# Patient Record
Sex: FEMALE | Race: ASIAN | NOT HISPANIC OR LATINO | Employment: UNEMPLOYED | URBAN - METROPOLITAN AREA
[De-identification: names, ages, dates, MRNs, and addresses within clinical notes are randomized per-mention and may not be internally consistent; named-entity substitution may affect disease eponyms.]

---

## 2021-12-14 ENCOUNTER — OFFICE VISIT (OUTPATIENT)
Dept: FAMILY MEDICINE CLINIC | Facility: CLINIC | Age: 31
End: 2021-12-14

## 2021-12-14 VITALS
BODY MASS INDEX: 33.88 KG/M2 | HEIGHT: 66 IN | WEIGHT: 210.8 LBS | DIASTOLIC BLOOD PRESSURE: 90 MMHG | OXYGEN SATURATION: 99 % | HEART RATE: 79 BPM | TEMPERATURE: 96.8 F | SYSTOLIC BLOOD PRESSURE: 137 MMHG

## 2021-12-14 DIAGNOSIS — N92.6 IRREGULAR PERIODS: ICD-10-CM

## 2021-12-14 DIAGNOSIS — Z86.32 HISTORY OF GESTATIONAL DIABETES: ICD-10-CM

## 2021-12-14 DIAGNOSIS — R63.5 WEIGHT GAIN: ICD-10-CM

## 2021-12-14 DIAGNOSIS — Z00.00 ROUTINE GENERAL MEDICAL EXAMINATION AT A HEALTH CARE FACILITY: Primary | ICD-10-CM

## 2021-12-14 LAB
B-HCG UR QL: NEGATIVE
BILIRUB BLD-MCNC: NEGATIVE MG/DL
CLARITY, POC: CLEAR
COLOR UR: YELLOW
EXPIRATION DATE: ABNORMAL
EXPIRATION DATE: NORMAL
GLUCOSE UR STRIP-MCNC: NEGATIVE MG/DL
INTERNAL NEGATIVE CONTROL: NORMAL
INTERNAL POSITIVE CONTROL: NORMAL
KETONES UR QL: NEGATIVE
LEUKOCYTE EST, POC: NEGATIVE
Lab: ABNORMAL
Lab: NORMAL
NITRITE UR-MCNC: NEGATIVE MG/ML
PH UR: 6 [PH] (ref 5–8)
PROT UR STRIP-MCNC: NEGATIVE MG/DL
RBC # UR STRIP: ABNORMAL /UL
SP GR UR: 1.03 (ref 1–1.03)
UROBILINOGEN UR QL: NORMAL

## 2021-12-14 PROCEDURE — 99385 PREV VISIT NEW AGE 18-39: CPT | Performed by: NURSE PRACTITIONER

## 2021-12-14 PROCEDURE — 81003 URINALYSIS AUTO W/O SCOPE: CPT | Performed by: NURSE PRACTITIONER

## 2021-12-14 PROCEDURE — 81025 URINE PREGNANCY TEST: CPT | Performed by: NURSE PRACTITIONER

## 2021-12-14 NOTE — PROGRESS NOTES
"Chief Complaint  Establish Care (new pt physical )    Subjective          Valeria Ulloa presents to [facility]  History of Present Illness new pt to [facility] care for physical.  Had considered herself overall healthy but recently has been gaining a lot of weight without diet change.  Does not really exercise due to time spent caring for house and 3 yr old child.      Had some hormonal issues in [place] but not been on meds in past.  Periods have been irregular.  Usually appr 6 weeks apart, but not had period now x 2 months.  Condoms for contraception.  Interested in IUD.  No desire for kids soon.     Has had quite a bit of stress, chronic sleep difficulties-trouble falling asleep.  Not taking anything.      Had gestational diabetes resolved after pregnancy and pregnancy induced HTN not medicated.     Dads side family with diabetes, HLD  Both sides have HTN    Denies:  fever, chills,weakness, rash, joint or muscle pain.  No HA, chest pain, palpitations, cough, dyspnea, trouble swallowing, sore throat, ear or nose problems.  No abd pain, n/v/d/constipation, melena, reflux.  No urinary problems, hematuria.  Denies anxiety, depression,      Admits: fatigue, unintentional weight change, insomnia, intermittently light headed.      PHQ-9 Total Score: 6  KIMBERLY 7 Total Score: 1    Admits snoring.  No screen for SHAGUFTA.     Objective   Vital Signs:   /90   Pulse 79   Temp 96.8 °F (36 °C)   Ht 167.6 cm (66\")   Wt 95.6 kg (210 lb 12.8 oz)   SpO2 99%   BMI 34.02 kg/m²     Physical Exam  Vitals and nursing note reviewed.   Constitutional:       General: She is not in acute distress.     Appearance: She is well-developed. She is obese. She is not ill-appearing.   HENT:      Head: Normocephalic and atraumatic.      Right Ear: Tympanic membrane, ear canal and external ear normal.      Left Ear: Tympanic membrane, ear canal and external ear normal.      Mouth/Throat:      Mouth: Mucous membranes are " moist.      Pharynx: Uvula midline. No posterior oropharyngeal erythema.   Eyes:      General:         Right eye: No discharge.         Left eye: No discharge.      Conjunctiva/sclera: Conjunctivae normal.      Pupils: Pupils are equal, round, and reactive to light.   Neck:      Thyroid: No thyromegaly.   Cardiovascular:      Rate and Rhythm: Normal rate and regular rhythm.      Heart sounds: Normal heart sounds. No murmur heard.      Pulmonary:      Effort: Pulmonary effort is normal.      Breath sounds: Normal breath sounds.   Abdominal:      General: Bowel sounds are normal.      Palpations: Abdomen is soft.      Tenderness: There is no abdominal tenderness.   Musculoskeletal:         General: No deformity.      Cervical back: Neck supple.      Comments: Gait smooth and steady   Lymphadenopathy:      Cervical: No cervical adenopathy.   Skin:     General: Skin is warm and dry.   Neurological:      General: No focal deficit present.      Mental Status: She is alert and oriented to person, place, and time.   Psychiatric:         Mood and Affect: Mood normal.         Speech: Speech normal.         Behavior: Behavior normal. Behavior is cooperative.         Thought Content: Thought content normal.        Result Review :                 Assessment and Plan    Diagnoses and all orders for this visit:    1. Routine general medical examination at a health care facility (Primary)  -     Vitamin B12  -     CBC & Differential  -     Comprehensive Metabolic Panel  -     Hemoglobin A1c  -     Lipid Panel With LDL / HDL Ratio  -     TSH Rfx On Abnormal To Free T4  -     Hepatitis C Antibody    2. Irregular periods  -     Ambulatory Referral to Gynecology  -     POCT urinalysis dipstick, automated  -     POCT pregnancy, urine    3. Weight gain    4. History of gestational diabetes      Pt will check BP at home various times of the day and bring back at next visit or send via [employer]    UA showed mild dehydration and mod blood.   She has no sx of UTI-may be starting period-although she is having no vaginal bleeding. Pregnancy test is negative.  Referral to GYN       As part of wellness and prevention, the following topics were discussed with the patient:   Nutrition-diet high in fresh/fozen veges, lower in carbs, unsaturated fats, and higher in lean proteins, adequate hydration-vegetarian-will check B12   Physical activity/regular exercise-150 mins per week of moderate activity that increases HR and is enjoyable to lower stress and improve CVD     Healthy weight-above goal BMI   Contraception-no plans for more children, currently using condoms, interested in IUD   Dental health-recommend twice per year dental cleans and daily flossing to lower inflammation in body   Mental health-stress mgmt and sleep hygiene   Immunization-UTD on covid, recommend flu                Follow Up   Return in about 2 weeks (around 12/28/2021).  Patient was given instructions and counseling regarding her condition or for health maintenance advice. Please see specific information pulled into the AVS if appropriate.

## 2021-12-15 DIAGNOSIS — D64.9 ANEMIA, UNSPECIFIED TYPE: Primary | ICD-10-CM

## 2021-12-15 LAB
ALBUMIN SERPL-MCNC: 4.4 G/DL (ref 3.8–4.8)
ALBUMIN/GLOB SERPL: 1.1 {RATIO} (ref 1.2–2.2)
ALP SERPL-CCNC: 66 IU/L (ref 44–121)
ALT SERPL-CCNC: 26 IU/L (ref 0–32)
AST SERPL-CCNC: 21 IU/L (ref 0–40)
BASOPHILS # BLD AUTO: 0.1 X10E3/UL (ref 0–0.2)
BASOPHILS NFR BLD AUTO: 1 %
BILIRUB SERPL-MCNC: 0.2 MG/DL (ref 0–1.2)
BUN SERPL-MCNC: 9 MG/DL (ref 6–20)
BUN/CREAT SERPL: 10 (ref 9–23)
CALCIUM SERPL-MCNC: 9.4 MG/DL (ref 8.7–10.2)
CHLORIDE SERPL-SCNC: 104 MMOL/L (ref 96–106)
CHOLEST SERPL-MCNC: 147 MG/DL (ref 100–199)
CO2 SERPL-SCNC: 19 MMOL/L (ref 20–29)
CREAT SERPL-MCNC: 0.88 MG/DL (ref 0.57–1)
EOSINOPHIL # BLD AUTO: 0.3 X10E3/UL (ref 0–0.4)
EOSINOPHIL NFR BLD AUTO: 3 %
ERYTHROCYTE [DISTWIDTH] IN BLOOD BY AUTOMATED COUNT: 17.9 % (ref 11.7–15.4)
GLOBULIN SER CALC-MCNC: 3.9 G/DL (ref 1.5–4.5)
GLUCOSE SERPL-MCNC: 87 MG/DL (ref 65–99)
HBA1C MFR BLD: 5.9 % (ref 4.8–5.6)
HCT VFR BLD AUTO: 34.1 % (ref 34–46.6)
HCV AB S/CO SERPL IA: <0.1 S/CO RATIO (ref 0–0.9)
HDLC SERPL-MCNC: 48 MG/DL
HGB BLD-MCNC: 9.9 G/DL (ref 11.1–15.9)
IMM GRANULOCYTES # BLD AUTO: 0 X10E3/UL (ref 0–0.1)
IMM GRANULOCYTES NFR BLD AUTO: 0 %
LDLC SERPL CALC-MCNC: 77 MG/DL (ref 0–99)
LDLC/HDLC SERPL: 1.6 RATIO (ref 0–3.2)
LYMPHOCYTES # BLD AUTO: 2.1 X10E3/UL (ref 0.7–3.1)
LYMPHOCYTES NFR BLD AUTO: 22 %
MCH RBC QN AUTO: 19 PG (ref 26.6–33)
MCHC RBC AUTO-ENTMCNC: 29 G/DL (ref 31.5–35.7)
MCV RBC AUTO: 65 FL (ref 79–97)
MONOCYTES # BLD AUTO: 0.7 X10E3/UL (ref 0.1–0.9)
MONOCYTES NFR BLD AUTO: 8 %
NEUTROPHILS # BLD AUTO: 6.3 X10E3/UL (ref 1.4–7)
NEUTROPHILS NFR BLD AUTO: 66 %
PLATELET # BLD AUTO: 423 X10E3/UL (ref 150–450)
POTASSIUM SERPL-SCNC: 5.1 MMOL/L (ref 3.5–5.2)
PROT SERPL-MCNC: 8.3 G/DL (ref 6–8.5)
RBC # BLD AUTO: 5.22 X10E6/UL (ref 3.77–5.28)
SODIUM SERPL-SCNC: 138 MMOL/L (ref 134–144)
TRIGL SERPL-MCNC: 123 MG/DL (ref 0–149)
TSH SERPL DL<=0.005 MIU/L-ACNC: 2.11 UIU/ML (ref 0.45–4.5)
VIT B12 SERPL-MCNC: 330 PG/ML (ref 232–1245)
VLDLC SERPL CALC-MCNC: 22 MG/DL (ref 5–40)
WBC # BLD AUTO: 9.6 X10E3/UL (ref 3.4–10.8)

## 2021-12-28 ENCOUNTER — OFFICE VISIT (OUTPATIENT)
Dept: FAMILY MEDICINE CLINIC | Facility: CLINIC | Age: 31
End: 2021-12-28

## 2021-12-28 VITALS
TEMPERATURE: 97.5 F | SYSTOLIC BLOOD PRESSURE: 133 MMHG | HEIGHT: 66 IN | RESPIRATION RATE: 12 BRPM | OXYGEN SATURATION: 100 % | BODY MASS INDEX: 33.91 KG/M2 | WEIGHT: 211 LBS | HEART RATE: 84 BPM | DIASTOLIC BLOOD PRESSURE: 82 MMHG

## 2021-12-28 DIAGNOSIS — D64.9 ANEMIA, UNSPECIFIED TYPE: Primary | ICD-10-CM

## 2021-12-28 DIAGNOSIS — E53.8 LOW SERUM VITAMIN B12: ICD-10-CM

## 2021-12-28 DIAGNOSIS — R73.03 PREDIABETES: ICD-10-CM

## 2021-12-28 PROCEDURE — 96372 THER/PROPH/DIAG INJ SC/IM: CPT | Performed by: NURSE PRACTITIONER

## 2021-12-28 PROCEDURE — 99214 OFFICE O/P EST MOD 30 MIN: CPT | Performed by: NURSE PRACTITIONER

## 2021-12-28 RX ORDER — CYANOCOBALAMIN 1000 UG/ML
1000 INJECTION, SOLUTION INTRAMUSCULAR; SUBCUTANEOUS ONCE
Status: COMPLETED | OUTPATIENT
Start: 2021-12-28 | End: 2021-12-28

## 2021-12-28 RX ORDER — MULTIVIT WITH MINERALS/LUTEIN
250 TABLET ORAL DAILY
Qty: 90 TABLET | Refills: 1 | Status: SHIPPED | OUTPATIENT
Start: 2021-12-28 | End: 2022-05-26

## 2021-12-28 RX ADMIN — CYANOCOBALAMIN 1000 MCG: 1000 INJECTION, SOLUTION INTRAMUSCULAR; SUBCUTANEOUS at 15:43

## 2021-12-28 NOTE — PROGRESS NOTES
"Answers for HPI/ROS submitted by the patient on 12/21/2021  Please describe your symptoms.: Tierdness, shortnes of breath, headach  Have you had these symptoms before?: No  How long have you been having these symptoms?: Greater than 2 weeks  What is the primary reason for your visit?: Other    Chief Complaint  No chief complaint on file.     CC:  Fatigue, lab follow up    Subjective          Valeria Ulloa presents to [facility]  History of Present Illness returns today for f/u on fatigue, labs.  She has reviewed labs just done.  Has had labs for thalassemia done, but not yet seen results which are not completely back yet.  Has not been told in the past that she has thalassemia and thinks she had lab work to be checked for it and was negative.  As far as she knows her child does not have thalassemia.  Unknown family history.    She was not surprised she had prediabetes.  She has been eating more carbohydrates than normal and with gestational diabetes was really good about being able to modify carbohydrates and use glucometer to monitor her glucose.  She prefers to use dietary modifications over starting medicine at this time.    Since last visit she did have a pretty heavy period.  Has had heavy periods in past.  Has not yet heard back on GYN referral.     Does not eat meat and not supplementing B12.       Objective   Vital Signs:   /82   Pulse 84   Temp 97.5 °F (36.4 °C) (Infrared)   Resp 12   Ht 167.6 cm (66\")   Wt 95.7 kg (211 lb)   SpO2 100%   BMI 34.06 kg/m²     Physical Exam  Vitals and nursing note reviewed.   Constitutional:       General: She is not in acute distress.     Appearance: She is well-developed. She is not ill-appearing or diaphoretic.   HENT:      Head: Normocephalic and atraumatic.   Eyes:      General:         Right eye: No discharge.         Left eye: No discharge.      Conjunctiva/sclera: Conjunctivae normal.   Cardiovascular:      Rate and " Rhythm: Normal rate and regular rhythm.      Heart sounds: Normal heart sounds.   Pulmonary:      Effort: Pulmonary effort is normal.      Breath sounds: Normal breath sounds.   Abdominal:      General: Bowel sounds are normal.      Palpations: Abdomen is soft.      Tenderness: There is no abdominal tenderness.   Musculoskeletal:         General: No deformity.      Comments: Gait smooth and steady   Skin:     General: Skin is warm and dry.   Neurological:      Mental Status: She is alert and oriented to person, place, and time.   Psychiatric:         Mood and Affect: Mood normal.         Behavior: Behavior normal.        Result Review :                 Assessment and Plan    Diagnoses and all orders for this visit:    1. Anemia, unspecified type (Primary)  -     Iron, Ferrous Sulfate, 325 (65 Fe) MG tablet; Take 1 tablet by mouth Daily. Take with vitamin C  Dispense: 90 tablet; Refill: 1  -     vitamin C (ASCORBIC ACID) 250 MG tablet; Take 1 tablet by mouth Daily.  Dispense: 90 tablet; Refill: 1  -     cyanocobalamin injection 1,000 mcg  -     Iron Profile; Future  -     Ferritin; Future  -     CBC & Differential; Future    2. Prediabetes  -     Hemoglobin A1c; Future    3. Low serum vitamin B12      Anemia:  thalassemia labs pending, but will start iron and vitamin C.  Her ferritin and iron saturation are very low. Will give her number for GYN to get scheduled for eval.  Not sure if heavy periods causing anemia.     Low B12:  Will start b12 injections weekly x 4, then monthly x 4 and then recheck B12 levels appr 1 month after last injection.  She can start oral additionally.     Prediabetes:   Seems to have good grasp of carb mgmt.  Will start managing diet again and will recheck a1c in 3 months with iron, ferritin, cbc.          Follow Up   No follow-ups on file.  Patient was given instructions and counseling regarding her condition or for health maintenance advice. Please see specific information pulled into the  AVS if appropriate.

## 2021-12-29 DIAGNOSIS — R73.03 PREDIABETES: Primary | ICD-10-CM

## 2021-12-29 DIAGNOSIS — Z86.32 HISTORY OF GESTATIONAL DIABETES: ICD-10-CM

## 2021-12-29 PROBLEM — D64.9 ANEMIA: Status: ACTIVE | Noted: 2021-12-29

## 2022-01-04 ENCOUNTER — CLINICAL SUPPORT (OUTPATIENT)
Dept: FAMILY MEDICINE CLINIC | Facility: CLINIC | Age: 32
End: 2022-01-04

## 2022-01-04 DIAGNOSIS — E53.8 LOW SERUM VITAMIN B12: Primary | ICD-10-CM

## 2022-01-04 PROCEDURE — 96372 THER/PROPH/DIAG INJ SC/IM: CPT | Performed by: NURSE PRACTITIONER

## 2022-01-04 RX ORDER — CYANOCOBALAMIN 1000 UG/ML
1000 INJECTION, SOLUTION INTRAMUSCULAR; SUBCUTANEOUS
Status: SHIPPED | OUTPATIENT
Start: 2022-01-04

## 2022-01-04 RX ADMIN — CYANOCOBALAMIN 1000 MCG: 1000 INJECTION, SOLUTION INTRAMUSCULAR; SUBCUTANEOUS at 16:40

## 2022-01-11 ENCOUNTER — CLINICAL SUPPORT (OUTPATIENT)
Dept: FAMILY MEDICINE CLINIC | Facility: CLINIC | Age: 32
End: 2022-01-11

## 2022-01-11 DIAGNOSIS — E53.8 LOW SERUM VITAMIN B12: Primary | ICD-10-CM

## 2022-01-11 PROCEDURE — 82607 VITAMIN B-12: CPT | Performed by: NURSE PRACTITIONER

## 2022-01-18 ENCOUNTER — CLINICAL SUPPORT (OUTPATIENT)
Dept: FAMILY MEDICINE CLINIC | Facility: CLINIC | Age: 32
End: 2022-01-18

## 2022-01-18 PROCEDURE — 96372 THER/PROPH/DIAG INJ SC/IM: CPT | Performed by: NURSE PRACTITIONER

## 2022-01-18 RX ADMIN — CYANOCOBALAMIN 1000 MCG: 1000 INJECTION, SOLUTION INTRAMUSCULAR; SUBCUTANEOUS at 16:14

## 2022-03-14 DIAGNOSIS — D64.9 ANEMIA, UNSPECIFIED TYPE: ICD-10-CM

## 2022-03-14 RX ORDER — MULTIVIT WITH MINERALS/LUTEIN
250 TABLET ORAL DAILY
Qty: 90 TABLET | Refills: 1 | OUTPATIENT
Start: 2022-03-14

## 2022-03-14 NOTE — TELEPHONE ENCOUNTER
Rx Refill Note  Requested Prescriptions     Pending Prescriptions Disp Refills   • Iron, Ferrous Sulfate, 325 (65 Fe) MG tablet 90 tablet 1     Sig: Take 1 tablet by mouth Daily. Take with vitamin C   • vitamin C (ASCORBIC ACID) 250 MG tablet 90 tablet 1     Sig: Take 1 tablet by mouth Daily.      Last office visit with prescribing clinician: 12/28/2021      Next office visit with prescribing clinician: Visit date not found            Meredith Bassett MA  03/14/22, 13:07 EDT

## 2022-05-11 ENCOUNTER — OFFICE VISIT (OUTPATIENT)
Dept: FAMILY MEDICINE CLINIC | Facility: CLINIC | Age: 32
End: 2022-05-11

## 2022-05-11 VITALS
HEIGHT: 66 IN | SYSTOLIC BLOOD PRESSURE: 129 MMHG | DIASTOLIC BLOOD PRESSURE: 80 MMHG | HEART RATE: 81 BPM | BODY MASS INDEX: 34.82 KG/M2 | WEIGHT: 216.7 LBS | OXYGEN SATURATION: 100 % | TEMPERATURE: 98 F

## 2022-05-11 DIAGNOSIS — E53.8 LOW SERUM VITAMIN B12: ICD-10-CM

## 2022-05-11 DIAGNOSIS — D50.0 IRON DEFICIENCY ANEMIA DUE TO CHRONIC BLOOD LOSS: Primary | ICD-10-CM

## 2022-05-11 DIAGNOSIS — R73.03 PREDIABETES: ICD-10-CM

## 2022-05-11 PROCEDURE — 99214 OFFICE O/P EST MOD 30 MIN: CPT | Performed by: NURSE PRACTITIONER

## 2022-05-11 RX ORDER — LANCETS 33 GAUGE
EACH MISCELLANEOUS 2 TIMES DAILY
COMMUNITY
Start: 2022-02-08

## 2022-05-11 RX ORDER — DROSPIRENONE 4 MG/1
1 TABLET, FILM COATED ORAL DAILY
COMMUNITY
Start: 2022-02-02

## 2022-05-11 RX ORDER — BLOOD SUGAR DIAGNOSTIC
STRIP MISCELLANEOUS 2 TIMES DAILY
COMMUNITY
Start: 2022-02-08

## 2022-05-11 NOTE — PROGRESS NOTES
"Answers for HPI/ROS submitted by the patient on 5/10/2022  Please describe your symptoms.: Follow up  Have you had these symptoms before?: No  How long have you been having these symptoms?: Greater than 2 weeks  Please list any medications you are currently taking for this condition.: No medicine  What is the primary reason for your visit?: Other    Chief Complaint  Anemia (F/u anemia )    Juani Ulloa presents to [facility]  History of Present Illness patient is here to follow-up labs for anemia.  She is taking and tolerating POP without side effects.  Periods are not currently excessive.    She completed iron with vitamin C.  Does feel a little bit more energetic.    She has been monitoring her blood sugar for prediabetes and appears they have been at goal 2 hours post prandial for the most part.  She does sometimes eat sweets and this will elevate her blood sugar as expected.    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  Reports spouse does not have similar as far she knows.  She does report she had malaria many years ago.    She is not excessively fatigued, no headache, no dizziness, no chest pain, no palpitations, no dyspnea with exertion.  No melena, hematuria, especially heavy periods.    Objective   Vital Signs:  /80   Pulse 81   Temp 98 °F (36.7 °C)   Ht 167.6 cm (66\")   Wt 98.3 kg (216 lb 11.2 oz)   SpO2 100%   BMI 34.98 kg/m²             Physical Exam  Vitals and nursing note reviewed.   Constitutional:       General: She is not in acute distress.     Appearance: She is well-developed. She is not ill-appearing or diaphoretic.   HENT:      Head: Normocephalic and atraumatic.   Eyes:      General:         Right eye: No discharge.         Left eye: No discharge.      Conjunctiva/sclera: Conjunctivae normal.   Cardiovascular:      Rate and Rhythm: Normal rate and regular " rhythm.      Heart sounds: Normal heart sounds.   Pulmonary:      Effort: Pulmonary effort is normal.      Breath sounds: Normal breath sounds.   Abdominal:      General: Bowel sounds are normal.      Palpations: Abdomen is soft.      Tenderness: There is no abdominal tenderness.   Musculoskeletal:         General: No deformity.      Cervical back: Neck supple.      Comments: Gait smooth and steady   Lymphadenopathy:      Cervical: No cervical adenopathy.   Skin:     General: Skin is warm and dry.   Neurological:      General: No focal deficit present.      Mental Status: She is alert and oriented to person, place, and time.   Psychiatric:         Mood and Affect: Mood normal.         Behavior: Behavior normal.      Comments: Very pleasant, conversant, good medical historian        Result Review :                 Assessment and Plan    Diagnoses and all orders for this visit:    1. Iron deficiency anemia due to chronic blood loss (Primary)    2. Low serum vitamin B12  -     Vitamin B12    3. Prediabetes      Iron deficiency anemia: She has completed supplementation with iron and vitamin C.  Will recheck levels.  If iron and ferritin are still low will continue supplementation.  She may appear anemic because of hemoglobinopathies so we will need to be careful with iron supplementation to avoid overload.    Low vitamin B12: She has been receiving supplement and will recheck.    Prediabetes: She has been monitoring her blood sugar at home with dietary changes.  Monitoring at home has been helpful for her to be able to make carbohydrate adjustments.  We will check A1c today and continue current plan pending A1c.    Reviewed hemoglobinopathy with patient.       Follow Up   No follow-ups on file.  Patient was given instructions and counseling regarding her condition or for health maintenance advice. Please see specific information pulled into the AVS if appropriate.

## 2022-05-26 ENCOUNTER — OFFICE VISIT (OUTPATIENT)
Dept: FAMILY MEDICINE CLINIC | Facility: CLINIC | Age: 32
End: 2022-05-26

## 2022-05-26 VITALS
TEMPERATURE: 97.5 F | HEIGHT: 66 IN | DIASTOLIC BLOOD PRESSURE: 82 MMHG | BODY MASS INDEX: 34.39 KG/M2 | OXYGEN SATURATION: 99 % | SYSTOLIC BLOOD PRESSURE: 120 MMHG | WEIGHT: 214 LBS | HEART RATE: 78 BPM

## 2022-05-26 DIAGNOSIS — E11.65 INADEQUATELY CONTROLLED DIABETES MELLITUS: Primary | ICD-10-CM

## 2022-05-26 PROCEDURE — 99214 OFFICE O/P EST MOD 30 MIN: CPT | Performed by: NURSE PRACTITIONER

## 2022-05-26 NOTE — PROGRESS NOTES
"Answers for HPI/ROS submitted by the patient on 5/26/2022  What is the primary reason for your visit?: Diabetes    Chief Complaint  Diabetes (F/u dm )    Subjective          Valeria Ulloa presents to [facility]  Diabetes  She has type 2 diabetes mellitus. No MedicAlert identification noted. Hypoglycemia symptoms include hunger, mood changes and sweats. Pertinent negatives for hypoglycemia include no confusion, dizziness, pallor, seizures, sleepiness, speech difficulty or tremors. Associated symptoms include polydipsia, polyphagia and polyuria. Pertinent negatives for diabetes include no blurred vision, no chest pain, no fatigue, no foot paresthesias, no foot ulcerations, no visual change, no weakness and no weight loss. Pertinent negatives for hypoglycemia complications include no blackouts, no hospitalization, no nocturnal hypoglycemia, no required assistance and no required glucagon injection. Symptoms are stable. Pertinent negatives for diabetic complications include no CVA, heart disease, impotence, nephropathy, peripheral neuropathy, PVD or retinopathy. There are no known risk factors for coronary artery disease. When asked about current treatments, none were reported. She is compliant with treatment most of the time.    pt here for [id number] dx. Had been hovering in prediabetic range and most recent A1C now in diabetic range.  She has been trying to make dietary modifications and even so has noted glucose elevated 140-150.     Taking multivitamin with Fe for Fe deficient anemia now repleted.  Low B12 has also been repleted.       Objective   Vital Signs:  /82   Pulse 78   Temp 97.5 °F (36.4 °C)   Ht 167.6 cm (66\")   Wt 97.1 kg (214 lb)   SpO2 99%   BMI 34.54 kg/m²         Physical Exam  Vitals and nursing note reviewed.   Constitutional:       General: She is not in acute distress.     Appearance: She is well-developed. She is not ill-appearing or diaphoretic. "   HENT:      Head: Normocephalic and atraumatic.   Eyes:      General:         Right eye: No discharge.         Left eye: No discharge.      Conjunctiva/sclera: Conjunctivae normal.   Cardiovascular:      Rate and Rhythm: Normal rate and regular rhythm.      Heart sounds: Normal heart sounds.   Pulmonary:      Effort: Pulmonary effort is normal.      Breath sounds: Normal breath sounds.   Abdominal:      General: Bowel sounds are normal.      Palpations: Abdomen is soft.      Tenderness: There is no abdominal tenderness.   Musculoskeletal:         General: No deformity.      Comments: Gait smooth and steady   Skin:     General: Skin is warm and dry.   Neurological:      General: No focal deficit present.      Mental Status: She is alert and oriented to person, place, and time.   Psychiatric:         Mood and Affect: Mood normal.         Behavior: Behavior normal.        Result Review :                 Assessment and Plan    Diagnoses and all orders for this visit:    1. Inadequately controlled diabetes mellitus (HCC) (Primary)  -     metFORMIN (Glucophage) 500 MG tablet; Take 1 tablet by mouth Daily With Breakfast.  Dispense: 90 tablet; Refill: 0  -     Basic Metabolic Panel; Future  -     Hemoglobin A1c; Future    Patient has gone from prediabetic to diabetic.  We discussed management options.  I think with her lifestyle changes that low-dose metformin will likely get her at least down to prediabetic stage.  We discussed side effects.  If she does not tolerate she will let me know.  Discussed lowering carbs in diet as well.  Discussed foot care and eye care.  We will get a BMP and an A1c in 3 months to see how this is working.  If her blood sugars still remain high and not really lowering on low-dose of metformin, I have asked her to let me know and we will go to metformin twice per day.    Anemia has resolved and we will have her continue her multivitamin with iron.  She is on POP to see if this helps control  periods.    B12 is now adequate and will have her continue multivitamin which has B12.    Will check CBC and B12 in 6 months for stability.         Follow Up   Return in about 3 months (around 8/26/2022).  Patient was given instructions and counseling regarding her condition or for health maintenance advice. Please see specific information pulled into the AVS if appropriate.

## 2022-07-08 DIAGNOSIS — E11.65 INADEQUATELY CONTROLLED DIABETES MELLITUS: ICD-10-CM

## 2022-07-08 NOTE — TELEPHONE ENCOUNTER
Rx Refill Note  Requested Prescriptions     Pending Prescriptions Disp Refills   • metFORMIN (Glucophage) 500 MG tablet 90 tablet 0     Sig: Take 1 tablet by mouth Daily With Breakfast.      Last office visit with prescribing clinician: 5/26/2022      Next office visit with prescribing clinician: Visit date not found            Veda Ritchie MA  07/08/22, 15:10 EDT

## 2022-09-09 DIAGNOSIS — E11.65 INADEQUATELY CONTROLLED DIABETES MELLITUS: ICD-10-CM

## 2022-09-09 NOTE — TELEPHONE ENCOUNTER
Rx Refill Note  Requested Prescriptions     Pending Prescriptions Disp Refills   • metFORMIN (Glucophage) 500 MG tablet 90 tablet 1     Sig: Take 1 tablet by mouth Daily With Breakfast.      Last office visit with prescribing clinician: 5/26/2022      Next office visit with prescribing clinician: Visit date not found       {TIP  Please add Last Relevant Lab Date if appropriate:5/11/2022    AUSTIN Seymour  09/09/22, 13:03 EDT

## 2024-03-22 ENCOUNTER — OFFICE VISIT (OUTPATIENT)
Dept: FAMILY MEDICINE CLINIC | Facility: CLINIC | Age: 34
End: 2024-03-22
Payer: COMMERCIAL

## 2024-03-22 VITALS
HEIGHT: 66 IN | RESPIRATION RATE: 17 BRPM | HEART RATE: 74 BPM | BODY MASS INDEX: 34.39 KG/M2 | DIASTOLIC BLOOD PRESSURE: 78 MMHG | SYSTOLIC BLOOD PRESSURE: 110 MMHG | OXYGEN SATURATION: 100 % | WEIGHT: 214 LBS | TEMPERATURE: 97.8 F

## 2024-03-22 DIAGNOSIS — E53.8 LOW SERUM VITAMIN B12: ICD-10-CM

## 2024-03-22 DIAGNOSIS — D50.0 IRON DEFICIENCY ANEMIA DUE TO CHRONIC BLOOD LOSS: ICD-10-CM

## 2024-03-22 DIAGNOSIS — H66.001 NON-RECURRENT ACUTE SUPPURATIVE OTITIS MEDIA OF RIGHT EAR WITHOUT SPONTANEOUS RUPTURE OF TYMPANIC MEMBRANE: ICD-10-CM

## 2024-03-22 DIAGNOSIS — E11.65 INADEQUATELY CONTROLLED DIABETES MELLITUS: Primary | ICD-10-CM

## 2024-03-22 PROCEDURE — 99214 OFFICE O/P EST MOD 30 MIN: CPT | Performed by: FAMILY MEDICINE

## 2024-03-22 PROCEDURE — 96372 THER/PROPH/DIAG INJ SC/IM: CPT | Performed by: FAMILY MEDICINE

## 2024-03-22 RX ORDER — CEFTRIAXONE SODIUM 250 MG/1
1000 INJECTION, POWDER, FOR SOLUTION INTRAMUSCULAR; INTRAVENOUS ONCE
Status: COMPLETED | OUTPATIENT
Start: 2024-03-22 | End: 2024-03-22

## 2024-03-22 RX ADMIN — CEFTRIAXONE SODIUM 1000 MG: 250 INJECTION, POWDER, FOR SOLUTION INTRAMUSCULAR; INTRAVENOUS at 16:28

## 2024-03-22 NOTE — PROGRESS NOTES
"Chief Complaint  Chief Complaint   Patient presents with    Diabetes     Pt here for f/u     Ear Fullness     Pt c/o ongoing ear fullness        Subjective    History of Present Illness        Valeria Ulloa presents to [facility] for   Earache   There is pain in the right ear. This is a new problem. The current episode started 1 to 4 weeks ago. The problem occurs daily. The problem has been coming and going. The maximum temperature recorded prior to her arrival was unmeasured. The fever has been present for 5 days or more. The pain is at a severity of 5/10. Associated symptoms include headaches and hearing loss. Pertinent negatives include no coughing or drainage. She has tried acetaminophen for the symptoms. The treatment provided significant relief.   Diabetes  She presents for her follow-up diabetic visit. She has type 2 diabetes mellitus. Her disease course has been stable. Hypoglycemia symptoms include headaches. Pertinent negatives for hypoglycemia include no mood changes, pallor, speech difficulty or sweats. Pertinent negatives for diabetes include no chest pain, no foot paresthesias, no polydipsia, no polyphagia and no weakness. Pertinent negatives for hypoglycemia complications include no blackouts and no required assistance. Symptoms are stable. Risk factors for coronary artery disease include diabetes mellitus. Her weight is stable. She is following a generally healthy diet.        Objective   Vital Signs:   Visit Vitals  /78   Pulse 74   Temp 97.8 °F (36.6 °C)   Resp 17   Ht 167.6 cm (66\")   Wt 97.1 kg (214 lb)   SpO2 100%   BMI 34.54 kg/m²          BMI is >= 30 and <35. (Class 1 Obesity). The following options were offered after discussion;: exercise counseling/recommendations and nutrition counseling/recommendations     Physical Exam  Vitals reviewed.   Constitutional:       Appearance: She is well-developed.   HENT:      Head: Normocephalic.      Right Ear: " External ear normal.      Left Ear: External ear normal.      Nose: Nose normal.   Eyes:      Conjunctiva/sclera: Conjunctivae normal.   Cardiovascular:      Rate and Rhythm: Normal rate and regular rhythm.   Pulmonary:      Effort: Pulmonary effort is normal.      Breath sounds: Normal breath sounds.   Musculoskeletal:         General: Normal range of motion.      Cervical back: Normal range of motion and neck supple.   Skin:     General: Skin is warm and dry.      Capillary Refill: Capillary refill takes less than 2 seconds.   Neurological:      Mental Status: She is alert and oriented to person, place, and time.            Result Review :                    Assessment and Plan      Diagnoses and all orders for this visit:    1. Inadequately controlled diabetes mellitus (Primary)  Assessment & Plan:  Diabetes is stable.   Continue current treatment regimen.  Reminded to bring in blood sugar diary at next visit.  Recommended an ADA diet.  Recommended a Mediterranean style of eating  Regular aerobic exercise.  Discussed ways to avoid symptomatic hypoglycemia.  Discussed sick day management.  Discussed foot care.  Diabetes will be reassessed in 6 months    Orders:  -     CBC & Differential  -     Comprehensive Metabolic Panel  -     TSH  -     Lipid Panel With / Chol / HDL Ratio  -     Hemoglobin A1c    2. Non-recurrent acute suppurative otitis media of right ear without spontaneous rupture of tympanic membrane  Assessment & Plan:  Patient was given a Rocephin 1 g IM injection in clinic.  Patient was encouraged to return if symptoms do not improve.    Orders:  -     cefTRIAXone (ROCEPHIN) injection 1,000 mg    3. Low serum vitamin B12  -     Vitamin B12    4. Iron deficiency anemia due to chronic blood loss  Assessment & Plan:  Labs ordered.    Orders:  -     Iron Profile             Follow Up   No follow-ups on file.  Patient was given instructions and counseling regarding her condition or for health maintenance  advice. Please see specific information pulled into the AVS if appropriate.       Answers submitted by the patient for this visit:  Primary Reason for Visit (Submitted on 3/22/2024)  What is the primary reason for your visit?: Ear Pain  Ear Pain Questionnaire (Submitted on 3/22/2024)  Chief Complaint: Ear pain  dizziness: No

## 2024-04-08 PROBLEM — H66.001 NON-RECURRENT ACUTE SUPPURATIVE OTITIS MEDIA OF RIGHT EAR WITHOUT SPONTANEOUS RUPTURE OF TYMPANIC MEMBRANE: Status: ACTIVE | Noted: 2024-04-08

## 2024-04-08 PROBLEM — E11.65 INADEQUATELY CONTROLLED DIABETES MELLITUS: Status: ACTIVE | Noted: 2024-04-08

## 2024-04-08 NOTE — ASSESSMENT & PLAN NOTE
Diabetes is stable.   Continue current treatment regimen.  Reminded to bring in blood sugar diary at next visit.  Recommended an ADA diet.  Recommended a Mediterranean style of eating  Regular aerobic exercise.  Discussed ways to avoid symptomatic hypoglycemia.  Discussed sick day management.  Discussed foot care.  Diabetes will be reassessed in 6 months

## 2024-04-08 NOTE — ASSESSMENT & PLAN NOTE
Patient was given a Rocephin 1 g IM injection in clinic.  Patient was encouraged to return if symptoms do not improve.

## 2024-04-23 LAB
ALBUMIN SERPL-MCNC: 4.3 G/DL (ref 3.5–5.2)
ALBUMIN/GLOB SERPL: 1.2 G/DL
ALP SERPL-CCNC: 66 U/L (ref 39–117)
ALT SERPL-CCNC: 31 U/L (ref 1–33)
AST SERPL-CCNC: 23 U/L (ref 1–32)
BASOPHILS # BLD AUTO: 0.03 10*3/MM3 (ref 0–0.2)
BASOPHILS NFR BLD AUTO: 0.3 % (ref 0–1.5)
BILIRUB SERPL-MCNC: 0.5 MG/DL (ref 0–1.2)
BUN SERPL-MCNC: 9 MG/DL (ref 6–20)
BUN/CREAT SERPL: 9.5 (ref 7–25)
CALCIUM SERPL-MCNC: 9.4 MG/DL (ref 8.6–10.5)
CHLORIDE SERPL-SCNC: 105 MMOL/L (ref 98–107)
CHOLEST SERPL-MCNC: 141 MG/DL (ref 0–200)
CHOLEST/HDLC SERPL: 2.76 {RATIO}
CO2 SERPL-SCNC: 24.5 MMOL/L (ref 22–29)
CREAT SERPL-MCNC: 0.95 MG/DL (ref 0.57–1)
EGFRCR SERPLBLD CKD-EPI 2021: 80.8 ML/MIN/1.73
EOSINOPHIL # BLD AUTO: 0.15 10*3/MM3 (ref 0–0.4)
EOSINOPHIL NFR BLD AUTO: 1.7 % (ref 0.3–6.2)
ERYTHROCYTE [DISTWIDTH] IN BLOOD BY AUTOMATED COUNT: 14.7 % (ref 12.3–15.4)
GLOBULIN SER CALC-MCNC: 3.5 GM/DL
GLUCOSE SERPL-MCNC: 97 MG/DL (ref 65–99)
HBA1C MFR BLD: 5.8 % (ref 4.8–5.6)
HCT VFR BLD AUTO: 37.7 % (ref 34–46.6)
HDLC SERPL-MCNC: 51 MG/DL (ref 40–60)
HGB BLD-MCNC: 11.8 G/DL (ref 12–15.9)
IMM GRANULOCYTES # BLD AUTO: 0.02 10*3/MM3 (ref 0–0.05)
IMM GRANULOCYTES NFR BLD AUTO: 0.2 % (ref 0–0.5)
IRON SATN MFR SERPL: 15 % (ref 20–50)
IRON SERPL-MCNC: 75 MCG/DL (ref 37–145)
LDLC SERPL CALC-MCNC: 74 MG/DL (ref 0–100)
LYMPHOCYTES # BLD AUTO: 1.78 10*3/MM3 (ref 0.7–3.1)
LYMPHOCYTES NFR BLD AUTO: 20 % (ref 19.6–45.3)
MCH RBC QN AUTO: 23.6 PG (ref 26.6–33)
MCHC RBC AUTO-ENTMCNC: 31.3 G/DL (ref 31.5–35.7)
MCV RBC AUTO: 75.6 FL (ref 79–97)
MONOCYTES # BLD AUTO: 0.64 10*3/MM3 (ref 0.1–0.9)
MONOCYTES NFR BLD AUTO: 7.2 % (ref 5–12)
NEUTROPHILS # BLD AUTO: 6.3 10*3/MM3 (ref 1.7–7)
NEUTROPHILS NFR BLD AUTO: 70.6 % (ref 42.7–76)
NRBC BLD AUTO-RTO: 0 /100 WBC (ref 0–0.2)
PLATELET # BLD AUTO: 348 10*3/MM3 (ref 140–450)
POTASSIUM SERPL-SCNC: 5.2 MMOL/L (ref 3.5–5.2)
PROT SERPL-MCNC: 7.8 G/DL (ref 6–8.5)
RBC # BLD AUTO: 4.99 10*6/MM3 (ref 3.77–5.28)
SODIUM SERPL-SCNC: 138 MMOL/L (ref 136–145)
TIBC SERPL-MCNC: 507 MCG/DL
TRIGL SERPL-MCNC: 82 MG/DL (ref 0–150)
TSH SERPL DL<=0.005 MIU/L-ACNC: 1.85 UIU/ML (ref 0.27–4.2)
UIBC SERPL-MCNC: 432 MCG/DL (ref 112–346)
VIT B12 SERPL-MCNC: 452 PG/ML (ref 211–946)
VLDLC SERPL CALC-MCNC: 16 MG/DL (ref 5–40)
WBC # BLD AUTO: 8.92 10*3/MM3 (ref 3.4–10.8)

## 2024-08-07 ENCOUNTER — OFFICE VISIT (OUTPATIENT)
Dept: FAMILY MEDICINE CLINIC | Facility: CLINIC | Age: 34
End: 2024-08-07
Payer: COMMERCIAL

## 2024-08-07 VITALS
HEART RATE: 84 BPM | OXYGEN SATURATION: 98 % | BODY MASS INDEX: 35.37 KG/M2 | WEIGHT: 220.1 LBS | SYSTOLIC BLOOD PRESSURE: 136 MMHG | HEIGHT: 66 IN | DIASTOLIC BLOOD PRESSURE: 80 MMHG

## 2024-08-07 DIAGNOSIS — R42 DIZZINESS: ICD-10-CM

## 2024-08-07 DIAGNOSIS — E28.2 PCOS (POLYCYSTIC OVARIAN SYNDROME): Primary | ICD-10-CM

## 2024-08-07 PROCEDURE — 99214 OFFICE O/P EST MOD 30 MIN: CPT | Performed by: FAMILY MEDICINE

## 2024-08-07 RX ORDER — NORETHINDRONE ACETATE AND ETHINYL ESTRADIOL .02; 1 MG/1; MG/1
1 TABLET ORAL DAILY
Qty: 90 TABLET | Refills: 3 | Status: SHIPPED | OUTPATIENT
Start: 2024-08-07

## 2024-08-07 NOTE — PROGRESS NOTES
"Chief Complaint  Chief Complaint   Patient presents with    Dizziness     Has been dizzy off and on for the past 2 months.        Subjective    History of Present Illness        Valeria Ulloa presents to [facility] for   History of Present Illness  The patient is a 33-year-old female who presents for evaluation of dizziness.    She reports experiencing increased dizziness, despite sleeping for 6 to 8 hours. Her iron saturation levels were found to be low in her last blood work. She also mentions shortness of breath, which improved after starting a multivitamin and prenatal care regimen. She experienced a similar episode of dizziness 3 years ago, which was diagnosed as B12 deficiency due to low iron saturation. Oral iron supplements cause her to feel uneasy, leading her to discontinue them. A friend, an oncologist, recommended liquid iron, which she took twice daily for 3 months. This treatment was effective, but she is currently out of this medication. Her dizziness has increased in frequency over the past 2 months.    She missed a menstrual cycle a few months ago, which then became regular after 34 days. She is seeking a referral to a gynecologist due to irregular periods, heavy bleeding, and cramping. She is planning for a second child in 6 months. She has tried birth control pills in the past, but discontinued after 2 months due to weight gain.     History of Present Illness      Objective   Vital Signs:   Visit Vitals  /80 (BP Location: Right arm, Patient Position: Sitting, Cuff Size: Large Adult)   Pulse 84   Ht 167.6 cm (66\")   Wt 99.8 kg (220 lb 1.6 oz)   SpO2 98%   BMI 35.53 kg/m²                Physical Exam  Vitals reviewed.   Constitutional:       Appearance: She is well-developed.   HENT:      Head: Normocephalic.      Right Ear: External ear normal.      Left Ear: External ear normal.      Nose: Nose normal.   Eyes:      Conjunctiva/sclera: Conjunctivae normal. "   Cardiovascular:      Rate and Rhythm: Normal rate and regular rhythm.   Pulmonary:      Effort: Pulmonary effort is normal.      Breath sounds: Normal breath sounds.   Musculoskeletal:         General: Normal range of motion.      Cervical back: Normal range of motion and neck supple.   Skin:     General: Skin is warm and dry.      Capillary Refill: Capillary refill takes less than 2 seconds.   Neurological:      Mental Status: She is alert and oriented to person, place, and time.        Physical Exam           Result Review :  Results  Laboratory Studies  Iron saturation is low. Hemoglobin is low. Blood sugar is 5.8.                          Assessment and Plan      Diagnoses and all orders for this visit:    1. PCOS (polycystic ovarian syndrome) (Primary)  Assessment & Plan:  Her Polycystic Ovary Syndrome (PCOS) is suspected due to her irregular, heavy, and painful menstrual cycles. A prescription for birth control pills was provided to regulate her menstrual cycle. She was encouraged to continue working on weight loss. A referral to a gynecologist was provided.    Orders:  -     norethindrone-ethinyl estradiol (Loestrin 1/20, 21,) 1-20 MG-MCG per tablet; Take 1 tablet by mouth Daily.  Dispense: 90 tablet; Refill: 3    2. Dizziness  Assessment & Plan:  Her dizziness may be due to being anemic.  Patient was advised to start taking iron but she does not tolerate the oral pills.  She reports that she can tolerate the liquid iron medication.  Will recheck labs at a later date.         Assessment & Plan             Follow Up   No follow-ups on file.  Patient was given instructions and counseling regarding her condition or for health maintenance advice. Please see specific information pulled into the AVS if appropriate.     Patient or patient representative verbalized consent for the use of Ambient Listening during the visit with  Manny De Jesus Sr, MD for chart documentation. 8/20/2024  15:50 EDT  Answers submitted by  the patient for this visit:  Other (Submitted on 7/31/2024)  Please describe your symptoms.: About my ear infection, Iron deficiency. Also have some gynecological issues  Have you had these symptoms before?: Yes  How long have you been having these symptoms?: Greater than 2 weeks  Primary Reason for Visit (Submitted on 7/31/2024)  What is the primary reason for your visit?: Other

## 2024-08-20 PROBLEM — E28.2 PCOS (POLYCYSTIC OVARIAN SYNDROME): Status: ACTIVE | Noted: 2024-08-20

## 2024-08-20 PROBLEM — R42 DIZZINESS: Status: ACTIVE | Noted: 2024-08-20

## 2024-08-20 NOTE — ASSESSMENT & PLAN NOTE
Her dizziness may be due to being anemic.  Patient was advised to start taking iron but she does not tolerate the oral pills.  She reports that she can tolerate the liquid iron medication.  Will recheck labs at a later date.

## 2024-08-20 NOTE — ASSESSMENT & PLAN NOTE
Her Polycystic Ovary Syndrome (PCOS) is suspected due to her irregular, heavy, and painful menstrual cycles. A prescription for birth control pills was provided to regulate her menstrual cycle. She was encouraged to continue working on weight loss. A referral to a gynecologist was provided.

## 2024-10-23 ENCOUNTER — OFFICE VISIT (OUTPATIENT)
Dept: OBSTETRICS AND GYNECOLOGY | Facility: CLINIC | Age: 34
End: 2024-10-23
Payer: COMMERCIAL

## 2024-10-23 VITALS
DIASTOLIC BLOOD PRESSURE: 70 MMHG | WEIGHT: 222 LBS | BODY MASS INDEX: 35.68 KG/M2 | SYSTOLIC BLOOD PRESSURE: 128 MMHG | HEIGHT: 66 IN

## 2024-10-23 DIAGNOSIS — E28.2 PCOS (POLYCYSTIC OVARIAN SYNDROME): ICD-10-CM

## 2024-10-23 DIAGNOSIS — N92.6 IRREGULAR PERIODS: ICD-10-CM

## 2024-10-23 DIAGNOSIS — E11.65 INADEQUATELY CONTROLLED DIABETES MELLITUS: ICD-10-CM

## 2024-10-23 DIAGNOSIS — Z13.89 SCREENING FOR GENITOURINARY CONDITION: Primary | ICD-10-CM

## 2024-10-23 DIAGNOSIS — D50.0 IRON DEFICIENCY ANEMIA DUE TO CHRONIC BLOOD LOSS: ICD-10-CM

## 2024-10-23 PROBLEM — Z86.32 HISTORY OF GESTATIONAL DIABETES: Status: ACTIVE | Noted: 2024-10-23

## 2024-10-23 PROBLEM — Z87.59 HISTORY OF PRE-ECLAMPSIA: Status: ACTIVE | Noted: 2024-10-23

## 2024-10-23 LAB
B-HCG UR QL: NEGATIVE
BILIRUB BLD-MCNC: NEGATIVE MG/DL
CLARITY, POC: CLEAR
COLOR UR: YELLOW
EXPIRATION DATE: NORMAL
GLUCOSE UR STRIP-MCNC: NEGATIVE MG/DL
INTERNAL NEGATIVE CONTROL: NORMAL
INTERNAL POSITIVE CONTROL: NORMAL
KETONES UR QL: NEGATIVE
LEUKOCYTE EST, POC: NEGATIVE
Lab: NORMAL
NITRITE UR-MCNC: NEGATIVE MG/ML
PH UR: 5 [PH] (ref 5–8)
PROT UR STRIP-MCNC: NEGATIVE MG/DL
RBC # UR STRIP: NEGATIVE /UL
SP GR UR: 1 (ref 1–1.03)
UROBILINOGEN UR QL: NORMAL

## 2024-10-23 NOTE — PROGRESS NOTES
GYN Annual Exam     CC- Here for annual exam   No chief complaint on file.         Valeria Ulloa is a 34 y.o. No obstetric history on file. female who presents for annual well woman exam. No LMP recorded.    Problems in addition to need for annual: ***    HPI: History of Present Illness    PMHX:  Patient Active Problem List   Diagnosis    History of gestational diabetes    Irregular periods    Weight gain    Anemia    Prediabetes    Inadequately controlled diabetes mellitus    Non-recurrent acute suppurative otitis media of right ear without spontaneous rupture of tympanic membrane    PCOS (polycystic ovarian syndrome)    Dizziness   ; otherwise none    OB History    No obstetric history on file.         Past Medical History:   Diagnosis Date    Diabetes mellitus 12/16/2021    Prediabetic       No past surgical history on file.      Current Outpatient Medications:     Lancets (OneTouch Delica Plus [id number]) misc, 2 (Two) Times a Day. test blood sugar (Patient not taking: Reported on 3/22/2024), Disp: , Rfl:     metFORMIN (Glucophage) 500 MG tablet, Take 1 tablet by mouth Daily With Breakfast. (Patient not taking: Reported on 3/22/2024), Disp: 90 tablet, Rfl: 1    norethindrone-ethinyl estradiol (Loestrin 1/20, 21,) 1-20 MG-MCG per tablet, Take 1 tablet by mouth Daily., Disp: 90 tablet, Rfl: 3    OneTouch Verio test strip, 2 (Two) Times a Day. test blood sugar (Patient not taking: Reported on 3/22/2024), Disp: , Rfl:     Slynd 4 MG tablet, Take 1 tablet by mouth Daily. (Patient not taking: Reported on 3/22/2024), Disp: , Rfl:     Current Facility-Administered Medications:     cyanocobalamin injection 1,000 mcg, 1,000 mcg, Intramuscular, Q28 Days, Andreia Davidson APRN, 1,000 mcg at 01/18/22 1614    No Known Allergies    Social History     Tobacco Use    Smoking status: Never    Smokeless tobacco: Never   Vaping Use    Vaping status: Never Used   Substance Use Topics    Alcohol use: Not Currently    Drug use:  Never       Valeria Ulloa  reports that she has never smoked. She has never used smokeless tobacco..   {Tobacco Education/Cessation (Optional):34334}    Family History   Problem Relation Age of Onset    Hypertension Mother     Hypertension Father     Diabetes Father     Hyperlipidemia Father        Review of Systems   Constitutional: Negative.    HENT: Negative.     Eyes: Negative.    Respiratory: Negative.     Cardiovascular: Negative.    Gastrointestinal: Negative.    Endocrine: Negative.    Genitourinary:  Positive for menstrual problem.   Musculoskeletal: Negative.    Skin: Negative.    Allergic/Immunologic: Negative.    Neurological: Negative.    Hematological: Negative.    Psychiatric/Behavioral: Negative.         ***Patient reports that she is not currently experiencing any symptoms of urinary incontinence.      ***TESTED FOR CHLAMYDIA?    Gardisil indicated? (9-44yo) 0,2,6 months: ***    EXAM:  There were no vitals taken for this visit.    Physical Exam  Vitals and nursing note reviewed. Exam conducted with a chaperone present.   Constitutional:       General: She is not in acute distress.     Appearance: She is well-developed. She is not diaphoretic.   HENT:      Head: Normocephalic and atraumatic.      Nose: Nose normal.   Eyes:      Extraocular Movements: Extraocular movements intact.   Cardiovascular:      Rate and Rhythm: Normal rate.   Pulmonary:      Effort: Pulmonary effort is normal.   Chest:   Breasts:     Breasts are symmetrical.      Right: Normal. No mass, nipple discharge, skin change or tenderness.      Left: Normal. No mass, nipple discharge, skin change or tenderness.   Abdominal:      General: There is no distension.      Palpations: Abdomen is soft. There is no mass.      Tenderness: There is no abdominal tenderness. There is no guarding.   Genitourinary:     General: Normal vulva.      Pubic Area: No rash.       Vagina: Normal. No vaginal discharge.      Cervix: Normal.      Uterus:  Normal.       Adnexa: Right adnexa normal and left adnexa normal.   Musculoskeletal:         General: No tenderness or deformity. Normal range of motion.      Cervical back: Normal range of motion.   Lymphadenopathy:      Upper Body:      Right upper body: No axillary adenopathy.      Left upper body: No axillary adenopathy.   Skin:     General: Skin is warm and dry.      Coloration: Skin is not pale.      Findings: No erythema or rash.   Neurological:      Mental Status: She is alert and oriented to person, place, and time.   Psychiatric:         Behavior: Behavior normal.         Thought Content: Thought content normal.         Judgment: Judgment normal.         Labs:   Lab Results (last 24 hours)       ** No results found for the last 24 hours. **            {OBGYN Imaging (Optional):00607}    As part of wellness and prevention, the following topics were discussed with the patient where appropriate: healthy weight, substance abuse/misuse, mental health, encouraging self breast exam, and other counseling and guidance done:  Nutrition, physical activity, healthy weight, injury prevention, misuse of tobacco, alcohol and drugs, sexual behavior and STDs, contraception, dental health, mental health, immunizations breast cancer screening and exams.    Assessment/Plan:     1) GYN annual well woman exam.   2) PAP done today? {yes and no:09384}  3) problems addressed: ***    Mammogram:   Last Completed Mammogram       This patient has no relevant Health Maintenance data.          MAMMOGRAM UP TO DATE IF AGE APPROPRIATE?  {yes and no:86170}  (if no, pt encouraged to schedule; risks of undiagnosed breast cancer reviewed with pt if she does not have a mammogram)    Colonoscopy:   Last Completed Colonoscopy       This patient has no relevant Health Maintenance data.          COLONOSCOPY UP TO DATE IF AGE APPROPRIATE? {yes and no:56551}  (if no, risks of undiagnosed colon cancer reviewed with pt if she fails to have the  colonoscopy)    Fhx breast cancer? {yes and no:23788}    Fhx ovarian cancer? {yes and no:98032}    Fhx colon cancer? {yes and no:80479}    Hereditary Cancer testing offered? {yes and no:25120}         Follow up prn or one year.    Pt instructed to call for results of any testing done today and that failure to call if she has not heard from us could result in inadequate treatment.  Pt verbalized her understanding.   Diagnoses and all orders for this visit:    1. Irregular periods (Primary)    2. Iron deficiency anemia due to chronic blood loss    3. PCOS (polycystic ovarian syndrome)    4. Inadequately controlled diabetes mellitus           RTO No follow-ups on file.      Yaya Krishna MD  10/23/24  12:14 EDT

## 2024-10-23 NOTE — PROGRESS NOTES
"EVALUATION AND MANAGEMENT ENCOUNTER    S:  Chief Complaint   Patient presents with    Menstrual Problem       HPI:  Valeria Ulloa is a 34 y.o.  with Patient's last menstrual period was 10/07/2024. here for evaluation for secondary infertility.  Pt and her  have one child, and they have been trying to get pregnant for almost a year now without success.  Pt was told she had PCOS in the past and was treated with metformin and OCs without proper evaluation.  Pt states her periods are irregular, sometimes early, sometimes late.      Pt denies hirsutism but has had some acne in the past.    Pt's main concern is getting pregnant.     Review of Systems   Constitutional: Negative.    HENT: Negative.     Eyes: Negative.    Respiratory: Negative.     Cardiovascular: Negative.    Gastrointestinal: Negative.    Endocrine: Negative.    Genitourinary:  Positive for menstrual problem.   Musculoskeletal: Negative.    Skin: Negative.    Allergic/Immunologic: Negative.    Neurological: Negative.    Hematological: Negative.    Psychiatric/Behavioral: Negative.     :    Patient reports that she is not currently experiencing any symptoms of urinary incontinence.      noTESTED FOR CHLAMYDIA?    .CESSATIONOPT    Vital Signs: /70   Ht 167.6 cm (66\")   Wt 101 kg (222 lb)   LMP 10/07/2024   Breastfeeding No   BMI 35.83 kg/m²    Flowsheet Rows      Flowsheet Row First Filed Value   Admission Height 167.6 cm (66\") Documented at 10/23/2024 1349   Admission Weight 101 kg (222 lb) Documented at 10/23/2024 1349            Brief Urine Lab Results  (Last result in the past 365 days)        Color   Clarity   Blood   Leuk Est   Nitrite   Protein   CREAT   Urine HCG        10/23/24 1355               Negative       10/23/24 1355 Yellow   Clear   Negative   Negative   Negative   Negative                   Physical Exam  Vitals and nursing note reviewed.   Constitutional:       Appearance: She is well-developed.   HENT:      " Head: Normocephalic and atraumatic.   Cardiovascular:      Rate and Rhythm: Normal rate.   Pulmonary:      Effort: Pulmonary effort is normal.   Abdominal:      General: There is no distension.      Palpations: Abdomen is soft. There is no mass.      Tenderness: There is no abdominal tenderness. There is no guarding.   Genitourinary:     Vagina: No vaginal discharge.   Musculoskeletal:         General: No tenderness or deformity. Normal range of motion.      Cervical back: Normal range of motion.   Skin:     General: Skin is warm and dry.      Coloration: Skin is not pale.      Findings: No erythema or rash.   Neurological:      Mental Status: She is alert and oriented to person, place, and time.   Psychiatric:         Behavior: Behavior normal.         Thought Content: Thought content normal.         Judgment: Judgment normal.             IMPRESSION:      Diagnoses and all orders for this visit:    1. Screening for genitourinary condition (Primary)  -     POC Urinalysis Dipstick  -     POC Pregnancy, Urine    2. Irregular periods    3. Iron deficiency anemia due to chronic blood loss    4. PCOS (polycystic ovarian syndrome)    5. Inadequately controlled diabetes mellitus          * No active hospital problems. *          PLAN:      Sonohystogram, embx, pap, and pt needs fasting PCOS labs at some point.     Pt instructed to call for results of any testing done today if she does not hear from us, and that failure to do so could result in inadequate treatment . Pt verbalized her understanding.     Return in about 2 weeks (around 11/6/2024) for Recheck..  Instructions and precautions given.     Time Spent: I spent 30+ minutes caring for Valeria on this date of service. This time includes time spent by me in the following activities: preparing for the visit, reviewing tests, obtaining and/or reviewing a separately obtained history, performing a medically appropriate examination and/or evaluation, counseling and  educating the patient/family/caregiver, ordering medications, tests, or procedures, referring and communicating with other health care professionals, documenting information in the medical record, independently interpreting results and communicating that information with the patient/family/caregiver, and care coordination.      Yaya Krishna MD  14:36 EDT   10/23/24

## 2024-11-20 PROBLEM — Z31.9 INFERTILITY MANAGEMENT: Status: ACTIVE | Noted: 2024-11-20

## 2024-11-21 NOTE — PROGRESS NOTES
PROCEDURE NOTE FOR SONOHYSTOGRAM OR SALINE INFUSION SONOGRAM (SIS);   PROCEDURE AND INTERPRETATION.        INDICATION/CC/PREOP DX: infertility, irregular periods    POSTOP DX: same    Informed consent obtained.    Pt present in ultrasound room, scount ultrasound was already done by technician.    ANESTHESIA USED: none    In dorsolithotomy position, open graves speculum placed in the vagina.  Pap smear done? no    The  cervix was painted with betadine.    Single tooth tenaculum placed on anterior cervical lip and uterus sounded to 6cms.    Endometrial biopsy suction pipette used to obtain endometrial biopsy and this was sent for permanent section.      Flexible sonohyst catheter placed in endomtrial canal and balloon inflated and 20ccs normal saline infused into endometrial cavity.  Outline of cavity seen clearly and documented.    All instruments were removed.    Pt tolerated procedure well and left in satisfactory condition.    Instructions and precautions given.      IMPRESSION/INTERPRETATION:  normal sonohystogram    PLAN:  call for results.    Yaya Krishna MD  14:27 EST  @today@

## 2024-11-25 ENCOUNTER — PROCEDURE VISIT (OUTPATIENT)
Dept: OBSTETRICS AND GYNECOLOGY | Facility: CLINIC | Age: 34
End: 2024-11-25
Payer: COMMERCIAL

## 2024-11-25 VITALS — BODY MASS INDEX: 35.83 KG/M2 | HEIGHT: 66 IN

## 2024-11-25 DIAGNOSIS — Z11.51 SCREENING FOR HUMAN PAPILLOMAVIRUS: ICD-10-CM

## 2024-11-25 DIAGNOSIS — Z31.9 INFERTILITY MANAGEMENT: ICD-10-CM

## 2024-11-25 DIAGNOSIS — N92.6 IRREGULAR PERIODS: ICD-10-CM

## 2024-11-25 DIAGNOSIS — Z01.419 CERVICAL SMEAR, AS PART OF ROUTINE GYNECOLOGICAL EXAMINATION: Primary | ICD-10-CM

## 2024-11-25 PROBLEM — Z86.32 HISTORY OF GESTATIONAL DIABETES: Status: RESOLVED | Noted: 2024-10-23 | Resolved: 2024-11-25

## 2024-11-25 LAB
B-HCG UR QL: NEGATIVE
BILIRUB BLD-MCNC: NEGATIVE MG/DL
CLARITY, POC: CLEAR
COLOR UR: YELLOW
EXPIRATION DATE: NORMAL
GLUCOSE UR STRIP-MCNC: NEGATIVE MG/DL
INTERNAL NEGATIVE CONTROL: NORMAL
INTERNAL POSITIVE CONTROL: NORMAL
KETONES UR QL: NEGATIVE
LEUKOCYTE EST, POC: NEGATIVE
Lab: NORMAL
NITRITE UR-MCNC: NEGATIVE MG/ML
PH UR: 5 [PH] (ref 5–8)
PROT UR STRIP-MCNC: NEGATIVE MG/DL
RBC # UR STRIP: ABNORMAL /UL
SP GR UR: 1 (ref 1–1.03)
UROBILINOGEN UR QL: NORMAL

## 2024-11-29 LAB
DX ICD CODE: NORMAL
DX ICD CODE: NORMAL
PATH REPORT.FINAL DX SPEC: NORMAL
PATH REPORT.GROSS SPEC: NORMAL
PATH REPORT.SITE OF ORIGIN SPEC: NORMAL
PATHOLOGIST NAME: NORMAL
PAYMENT PROCEDURE: NORMAL

## 2024-12-04 LAB
CYTOLOGIST CVX/VAG CYTO: NORMAL
CYTOLOGY CVX/VAG DOC CYTO: NORMAL
CYTOLOGY CVX/VAG DOC THIN PREP: NORMAL
DX ICD CODE: NORMAL
HPV I/H RISK 4 DNA CVX QL PROBE+SIG AMP: NEGATIVE
Lab: NORMAL
OTHER STN SPEC: NORMAL
STAT OF ADQ CVX/VAG CYTO-IMP: NORMAL

## 2024-12-14 NOTE — PROGRESS NOTES
"EVALUATION AND MANAGEMENT ENCOUNTER    S:  Chief Complaint   Patient presents with    Follow-up     Bx results       HPI:  Valeria Ulloa is a 34 y.o.  with Patient's last menstrual period was 2024 (exact date). here to make a plan for infertility.   needs semen analysis.    Review of Systems   Constitutional: Negative.    HENT: Negative.     Eyes: Negative.    Respiratory: Negative.     Cardiovascular: Negative.    Gastrointestinal: Negative.    Endocrine: Negative.    Musculoskeletal: Negative.    Skin: Negative.    Allergic/Immunologic: Negative.    Neurological: Negative.    Hematological: Negative.    Psychiatric/Behavioral: Negative.     :    Patient reports that she is not currently experiencing any symptoms of urinary incontinence.      noTESTED FOR CHLAMYDIA?    .CESSATIONOPT    Vital Signs: /88   Ht 167.6 cm (66\")   Wt 100 kg (221 lb 6.4 oz)   LMP 2024 (Exact Date)   BMI 35.73 kg/m²      Brief Urine Lab Results  (Last result in the past 365 days)        Color   Clarity   Blood   Leuk Est   Nitrite   Protein   CREAT   Urine HCG        24 1428               Negative       24 1428 Yellow   Clear   Moderate   Negative   Negative   Negative                   Physical Exam  Vitals and nursing note reviewed.   Constitutional:       Appearance: She is well-developed.   HENT:      Head: Normocephalic and atraumatic.   Cardiovascular:      Rate and Rhythm: Normal rate.   Pulmonary:      Effort: Pulmonary effort is normal.   Abdominal:      General: There is no distension.      Palpations: Abdomen is soft. There is no mass.      Tenderness: There is no abdominal tenderness. There is no guarding.   Genitourinary:     Vagina: No vaginal discharge.   Musculoskeletal:         General: No tenderness or deformity. Normal range of motion.      Cervical back: Normal range of motion.   Skin:     General: Skin is warm and dry.      Coloration: Skin is not pale.      Findings: " No erythema or rash.   Neurological:      Mental Status: She is alert and oriented to person, place, and time.   Psychiatric:         Behavior: Behavior normal.         Thought Content: Thought content normal.         Judgment: Judgment normal.             IMPRESSION:      Diagnoses and all orders for this visit:    1. PCOS (polycystic ovarian syndrome) (Primary)    2. Inadequately controlled diabetes mellitus    3. Infertility management          * No active hospital problems. *          PLAN:      Semen analysis, ovulation predictor kits, consider clomid at some point.  Pt has had a child before.     Pt instructed to call for results of any testing done today if she does not hear from us, and that failure to do so could result in inadequate treatment . Pt verbalized her understanding.     No follow-ups on file..  Instructions and precautions given.     Time Spent: I spent 20+ minutes caring for Valeria on this date of service. This time includes time spent by me in the following activities: preparing for the visit, reviewing tests, obtaining and/or reviewing a separately obtained history, performing a medically appropriate examination and/or evaluation, counseling and educating the patient/family/caregiver, ordering medications, tests, or procedures, referring and communicating with other health care professionals, documenting information in the medical record, independently interpreting results and communicating that information with the patient/family/caregiver, and care coordination.      Yaya Krishna MD  15:34 EST   12/17/24

## 2024-12-16 ENCOUNTER — OFFICE VISIT (OUTPATIENT)
Dept: OBSTETRICS AND GYNECOLOGY | Facility: CLINIC | Age: 34
End: 2024-12-16
Payer: COMMERCIAL

## 2024-12-16 VITALS
BODY MASS INDEX: 35.58 KG/M2 | DIASTOLIC BLOOD PRESSURE: 88 MMHG | SYSTOLIC BLOOD PRESSURE: 138 MMHG | HEIGHT: 66 IN | WEIGHT: 221.4 LBS

## 2024-12-16 DIAGNOSIS — Z31.9 INFERTILITY MANAGEMENT: ICD-10-CM

## 2024-12-16 DIAGNOSIS — E11.65 INADEQUATELY CONTROLLED DIABETES MELLITUS: ICD-10-CM

## 2024-12-16 DIAGNOSIS — E28.2 PCOS (POLYCYSTIC OVARIAN SYNDROME): Primary | ICD-10-CM

## 2025-03-20 ENCOUNTER — OFFICE VISIT (OUTPATIENT)
Dept: FAMILY MEDICINE CLINIC | Facility: CLINIC | Age: 35
End: 2025-03-20
Payer: COMMERCIAL

## 2025-03-20 VITALS
OXYGEN SATURATION: 99 % | SYSTOLIC BLOOD PRESSURE: 128 MMHG | HEART RATE: 88 BPM | DIASTOLIC BLOOD PRESSURE: 76 MMHG | HEIGHT: 66 IN | WEIGHT: 223 LBS | BODY MASS INDEX: 35.84 KG/M2

## 2025-03-20 DIAGNOSIS — R51.9 SEVERE HEADACHE: Primary | ICD-10-CM

## 2025-03-20 DIAGNOSIS — H92.01 RIGHT EAR PAIN: ICD-10-CM

## 2025-03-20 PROCEDURE — 99214 OFFICE O/P EST MOD 30 MIN: CPT | Performed by: FAMILY MEDICINE

## 2025-03-20 RX ORDER — PREDNISONE 20 MG/1
40 TABLET ORAL DAILY
Qty: 10 TABLET | Refills: 0 | Status: SHIPPED | OUTPATIENT
Start: 2025-03-20 | End: 2025-03-25

## 2025-03-20 RX ORDER — ACETAMINOPHEN AND CODEINE PHOSPHATE 300; 30 MG/1; MG/1
1 TABLET ORAL EVERY 6 HOURS PRN
Qty: 30 TABLET | Refills: 0 | Status: SHIPPED | OUTPATIENT
Start: 2025-03-20

## 2025-03-20 NOTE — PROGRESS NOTES
Chief Complaint  Chief Complaint   Patient presents with    Headache     Severe       Subjective    History of Present Illness        Valeria Ulloa presents to [facility] for   History of Present Illness  The patient presents for evaluation of right ear pain and headaches.    She has been experiencing intermittent hearing loss in her right ear, which she initially attributed to water exposure during head baths. However, following a trip to [place] in 12/2024, she experienced persistent hearing impairment in the same ear for a month. An ENT specialist in [place] [facility] attempted to clean her ear with suction but was unsuccessful due to an unidentified obstruction. A CT scan was recommended, but she declined due to her impending return to the [place]. The specialist suggested the possibility of a chronic disease or long-term infection. She reports no pain but has been experiencing headaches for the past month, which have become severe over the last 5 to 6 days, to the point where she is unable to tie her hair. She has also had 4 to 5 episodes of fever since 02/2025, which resolved within a day with medication. She reports no symptoms of influenza, cold, or cough. Her headaches are now generalized and have been so severe that she has been unable to sleep for the past two nights. The intensity and timing of the pain vary, and it often radiates from her right ear to the top of her head. She has been advised against using Q-tips for ear cleaning. She recalls an episode last week when she experienced severe pain while washing her hair with cold water. She has been monitoring her blood pressure at home and noticed an elevated reading of 178 systolic during a severe headache episode last week. She typically consumes tea with milk once a day but has increased her intake to twice daily during severe headaches, which seems to provide some relief. She has been avoiding painkillers. She has tried steroid drops  "and a 5-day course of antibiotics without success. She also reports that her ear feels clogged after sleeping on that side. She has taken antibiotics four times in the past year, including two courses in 01/2025, without relief. She received an injection in 08/2024, which temporarily alleviated the clogging but led to occasional headaches. She believes her headaches may be related to air pressure changes during flights. She describes herself as an overthinker and notes that her headaches worsen with excessive thinking. She rates her current pain as 6 or 7 out of 10 and describes it as sharp and shocking. She has recently started wearing new glasses and wonders if her headaches could be related to this change. She has a history of sleep issues from 2020 to 2021, which lasted for more than two years. She requires 7 to 8 hours of sleep for optimal relaxation but has been experiencing sleep disturbances recently. She plans to start a lifestyle program tomorrow to address potential hormonal imbalances. She has been avoiding heavy painkillers and melatonin.    Severe headache  Symptoms are: new.   Onset was 1 to 6 months.   Symptoms occur: constantly.  Symptoms include: fatigue, fever, headaches and weakness.      History of Present Illness      Objective   Vital Signs:   Visit Vitals  /76 (BP Location: Right arm, Patient Position: Sitting, Cuff Size: Large Adult)   Pulse 88   Ht 167.6 cm (65.98\")   Wt 101 kg (223 lb)   SpO2 99%   BMI 36.01 kg/m²                Physical Exam  Vitals reviewed.   Constitutional:       Appearance: She is well-developed.   HENT:      Head: Normocephalic.      Right Ear: External ear normal.      Left Ear: External ear normal.      Ears:      Comments: There is a pimple-like structure in the right ear canal at the 10 o'clock position.  This lesion is tender to touch.  There is no bleeding or any drainage.     Nose: Nose normal.   Eyes:      Conjunctiva/sclera: Conjunctivae normal. "   Cardiovascular:      Rate and Rhythm: Normal rate and regular rhythm.   Pulmonary:      Effort: Pulmonary effort is normal.      Breath sounds: Normal breath sounds.   Musculoskeletal:         General: Normal range of motion.      Cervical back: Normal range of motion and neck supple.   Skin:     General: Skin is warm and dry.      Capillary Refill: Capillary refill takes less than 2 seconds.   Neurological:      Mental Status: She is alert and oriented to person, place, and time.        Physical Exam           Result Review :  Results                            Assessment and Plan      Diagnoses and all orders for this visit:    1. Severe headache (Primary)  Assessment & Plan:  The headache may be related to the bump in the right ear canal or could be due to other causes such as migraine or stress. A stat CT scan of the head will be ordered to rule out any intracranial pathology. A prescription for prednisone will be provided to reduce any potential inflammation. For pain management, a prescription for Tylenol No. 3 will be given, with instructions to take it only when at home and to avoid driving after consumption.    Orders:  -     CT Head Without Contrast; Future  -     Ambulatory Referral to ENT (Otolaryngology)    2. Right ear pain  Assessment & Plan:  The presence of a bump in the right ear canal is likely causing her discomfort and pain. The bump does not appear to be infected, but it could potentially be an abscess or a skin infection within the ear canal. The possibility of a pimple-like lesion can not be ruled out. The pain could be due to increased pressure on the trigeminal nerve. A referral to an ENT specialist will be made for further evaluation. A stat CT scan of the head will be ordered to rule out any intracranial pathology that may be contributing to her symptoms. A prescription for prednisone will be provided, which she is advised to start taking from tomorrow morning. Additionally, a  prescription for Tylenol No. 3 will be given for pain management, with instructions to take it only when at home and to avoid driving after consumption.    Orders:  -     predniSONE (DELTASONE) 20 MG tablet; Take 2 tablets by mouth Daily for 5 days.  Dispense: 10 tablet; Refill: 0  -     acetaminophen-codeine (TYLENOL with CODEINE #3) 300-30 MG per tablet; Take 1 tablet by mouth Every 6 (Six) Hours As Needed for Moderate Pain.  Dispense: 30 tablet; Refill: 0  -     CT Head Without Contrast; Future  -     Ambulatory Referral to ENT (Otolaryngology)       Assessment & Plan             Follow Up   No follow-ups on file.  Patient was given instructions and counseling regarding her condition or for health maintenance advice. Please see specific information pulled into the AVS if appropriate.     Patient or patient representative verbalized consent for the use of Ambient Listening during the visit with  Manny De Jesus Sr, MD for chart documentation. 3/20/2025  14:27 EDT
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
## 2025-03-21 ENCOUNTER — HOSPITAL ENCOUNTER (OUTPATIENT)
Dept: CT IMAGING | Facility: HOSPITAL | Age: 35
Discharge: HOME | End: 2025-03-21
Admitting: FAMILY MEDICINE
Payer: COMMERCIAL

## 2025-03-21 ENCOUNTER — PATIENT MESSAGE (OUTPATIENT)
Dept: FAMILY MEDICINE CLINIC | Facility: CLINIC | Age: 35
End: 2025-03-21
Payer: COMMERCIAL

## 2025-03-21 DIAGNOSIS — H92.01 RIGHT EAR PAIN: ICD-10-CM

## 2025-03-21 DIAGNOSIS — R51.9 SEVERE HEADACHE: ICD-10-CM

## 2025-03-21 DIAGNOSIS — R51.9 SEVERE HEADACHE: Primary | ICD-10-CM

## 2025-03-21 PROCEDURE — 70450 CT HEAD/BRAIN W/O DYE: CPT

## 2025-03-27 RX ORDER — SUMATRIPTAN 50 MG/1
50 TABLET, FILM COATED ORAL
Qty: 12 TABLET | Refills: 3 | Status: SHIPPED | OUTPATIENT
Start: 2025-03-27